# Patient Record
Sex: FEMALE | Race: WHITE
[De-identification: names, ages, dates, MRNs, and addresses within clinical notes are randomized per-mention and may not be internally consistent; named-entity substitution may affect disease eponyms.]

---
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
## 2022-07-24 ENCOUNTER — HOSPITAL ENCOUNTER (EMERGENCY)
Dept: HOSPITAL 75 - ER | Age: 22
Discharge: HOME | End: 2022-07-24
Payer: MEDICAID

## 2022-07-24 VITALS — DIASTOLIC BLOOD PRESSURE: 111 MMHG | SYSTOLIC BLOOD PRESSURE: 160 MMHG

## 2022-07-24 VITALS — WEIGHT: 139.99 LBS | HEIGHT: 68.9 IN | BODY MASS INDEX: 20.73 KG/M2

## 2022-07-24 DIAGNOSIS — S09.93XA: Primary | ICD-10-CM
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
## 2022-07-24 PROCEDURE — 99281 EMR DPT VST MAYX REQ PHY/QHP: CPT

## 2022-09-16 ENCOUNTER — HOSPITAL ENCOUNTER (EMERGENCY)
Dept: HOSPITAL 75 - ER | Age: 22
Discharge: HOME | End: 2022-09-16
Payer: MEDICAID

## 2022-09-16 VITALS — SYSTOLIC BLOOD PRESSURE: 144 MMHG | DIASTOLIC BLOOD PRESSURE: 95 MMHG

## 2022-09-16 VITALS — BODY MASS INDEX: 20.73 KG/M2 | WEIGHT: 139.99 LBS | HEIGHT: 69.02 IN

## 2022-09-16 DIAGNOSIS — F12.90: ICD-10-CM

## 2022-09-16 DIAGNOSIS — S39.012A: ICD-10-CM

## 2022-09-16 DIAGNOSIS — Z28.310: ICD-10-CM
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
## 2022-09-16 DIAGNOSIS — S22.32XA: Primary | ICD-10-CM

## 2022-09-16 DIAGNOSIS — S06.9X9A: ICD-10-CM

## 2022-09-16 DIAGNOSIS — K52.9: ICD-10-CM

## 2022-09-16 DIAGNOSIS — S00.11XA: ICD-10-CM

## 2022-09-16 DIAGNOSIS — N39.0: ICD-10-CM

## 2022-09-16 LAB
ALBUMIN SERPL-MCNC: 3.8 GM/DL (ref 3.2–4.5)
ALP SERPL-CCNC: 73 U/L (ref 40–136)
ALT SERPL-CCNC: 18 U/L (ref 0–55)
AMYLASE SERPL-CCNC: 59 U/L (ref 25–125)
APAP SERPL-MCNC: < 10 UG/ML (ref 10–30)
APTT BLD: 29 SEC (ref 24–35)
APTT PPP: YELLOW S
BACTERIA #/AREA URNS HPF: (no result) /HPF
BARBITURATES UR QL: NEGATIVE
BASOPHILS # BLD AUTO: 0 10^3/UL (ref 0–0.1)
BASOPHILS NFR BLD AUTO: 1 % (ref 0–10)
BENZODIAZ UR QL SCN: NEGATIVE
BILIRUB SERPL-MCNC: 0.6 MG/DL (ref 0.1–1)
BILIRUB UR QL STRIP: NEGATIVE
BUN/CREAT SERPL: 11
CALCIUM SERPL-MCNC: 9 MG/DL (ref 8.5–10.1)
CHLORIDE SERPL-SCNC: 99 MMOL/L (ref 98–107)
CK MB SERPL-MCNC: 2.1 NG/ML (ref ?–6.6)
CK SERPL-CCNC: 254 U/L (ref 29–168)
CO2 SERPL-SCNC: 23 MMOL/L (ref 21–32)
COCAINE UR QL: NEGATIVE
CREAT SERPL-MCNC: 0.8 MG/DL (ref 0.6–1.3)
EOSINOPHIL # BLD AUTO: 0 10^3/UL (ref 0–0.3)
EOSINOPHIL NFR BLD AUTO: 0 % (ref 0–10)
FIBRINOGEN PPP-MCNC: CLEAR MG/DL
GFR SERPLBLD BASED ON 1.73 SQ M-ARVRAT: 108 ML/MIN
GLUCOSE SERPL-MCNC: 100 MG/DL (ref 70–105)
GLUCOSE UR STRIP-MCNC: NEGATIVE MG/DL
HCT VFR BLD CALC: 39 % (ref 35–52)
HGB BLD-MCNC: 13.4 G/DL (ref 11.5–16)
INR PPP: 1 (ref 0.8–1.4)
KETONES UR QL STRIP: NEGATIVE
LEUKOCYTE ESTERASE UR QL STRIP: NEGATIVE
LIPASE SERPL-CCNC: 33 U/L (ref 8–78)
LYMPHOCYTES # BLD AUTO: 1 10^3/UL (ref 1–4)
LYMPHOCYTES NFR BLD AUTO: 11 % (ref 12–44)
MAGNESIUM SERPL-MCNC: 1.6 MG/DL (ref 1.6–2.4)
MANUAL DIFFERENTIAL PERFORMED BLD QL: NO
MCH RBC QN AUTO: 31 PG (ref 25–34)
MCHC RBC AUTO-ENTMCNC: 34 G/DL (ref 32–36)
MCV RBC AUTO: 92 FL (ref 80–99)
METHADONE UR QL SCN: NEGATIVE
MONOCYTES # BLD AUTO: 0.6 10^3/UL (ref 0–1)
MONOCYTES NFR BLD AUTO: 7 % (ref 0–12)
NEUTROPHILS # BLD AUTO: 7.2 10^3/UL (ref 1.8–7.8)
NEUTROPHILS NFR BLD AUTO: 81 % (ref 42–75)
NITRITE UR QL STRIP: NEGATIVE
OPIATES UR QL SCN: NEGATIVE
OXYCODONE UR QL: NEGATIVE
PH UR STRIP: 6 [PH] (ref 5–9)
PLATELET # BLD: 210 10^3/UL (ref 130–400)
PMV BLD AUTO: 11.4 FL (ref 9–12.2)
POTASSIUM SERPL-SCNC: 3.3 MMOL/L (ref 3.6–5)
PROPOXYPH UR QL: NEGATIVE
PROT SERPL-MCNC: 6.8 GM/DL (ref 6.4–8.2)
PROT UR QL STRIP: NEGATIVE
PROTHROMBIN TIME: 13.7 SEC (ref 12.2–14.7)
RBC #/AREA URNS HPF: (no result) /HPF
SODIUM SERPL-SCNC: 136 MMOL/L (ref 135–145)
SP GR UR STRIP: 1.01 (ref 1.02–1.02)
SQUAMOUS #/AREA URNS HPF: (no result) /HPF
TRICYCLICS UR QL SCN: NEGATIVE
WBC # BLD AUTO: 8.8 10^3/UL (ref 4.3–11)
WBC #/AREA URNS HPF: (no result) /HPF

## 2022-09-16 PROCEDURE — 72170 X-RAY EXAM OF PELVIS: CPT

## 2022-09-16 PROCEDURE — 85610 PROTHROMBIN TIME: CPT

## 2022-09-16 PROCEDURE — 80053 COMPREHEN METABOLIC PANEL: CPT

## 2022-09-16 PROCEDURE — 81000 URINALYSIS NONAUTO W/SCOPE: CPT

## 2022-09-16 PROCEDURE — 80320 DRUG SCREEN QUANTALCOHOLS: CPT

## 2022-09-16 PROCEDURE — 71045 X-RAY EXAM CHEST 1 VIEW: CPT

## 2022-09-16 PROCEDURE — 70486 CT MAXILLOFACIAL W/O DYE: CPT

## 2022-09-16 PROCEDURE — 82150 ASSAY OF AMYLASE: CPT

## 2022-09-16 PROCEDURE — 72128 CT CHEST SPINE W/O DYE: CPT

## 2022-09-16 PROCEDURE — 83874 ASSAY OF MYOGLOBIN: CPT

## 2022-09-16 PROCEDURE — 80329 ANALGESICS NON-OPIOID 1 OR 2: CPT

## 2022-09-16 PROCEDURE — 83690 ASSAY OF LIPASE: CPT

## 2022-09-16 PROCEDURE — 82553 CREATINE MB FRACTION: CPT

## 2022-09-16 PROCEDURE — 71260 CT THORAX DX C+: CPT

## 2022-09-16 PROCEDURE — 70450 CT HEAD/BRAIN W/O DYE: CPT

## 2022-09-16 PROCEDURE — 83735 ASSAY OF MAGNESIUM: CPT

## 2022-09-16 PROCEDURE — 36415 COLL VENOUS BLD VENIPUNCTURE: CPT

## 2022-09-16 PROCEDURE — 85730 THROMBOPLASTIN TIME PARTIAL: CPT

## 2022-09-16 PROCEDURE — 84703 CHORIONIC GONADOTROPIN ASSAY: CPT

## 2022-09-16 PROCEDURE — 74177 CT ABD & PELVIS W/CONTRAST: CPT

## 2022-09-16 PROCEDURE — 72125 CT NECK SPINE W/O DYE: CPT

## 2022-09-16 PROCEDURE — 82550 ASSAY OF CK (CPK): CPT

## 2022-09-16 PROCEDURE — 85025 COMPLETE CBC W/AUTO DIFF WBC: CPT

## 2022-09-16 PROCEDURE — 87088 URINE BACTERIA CULTURE: CPT

## 2022-09-16 PROCEDURE — 72131 CT LUMBAR SPINE W/O DYE: CPT

## 2022-09-16 PROCEDURE — 80306 DRUG TEST PRSMV INSTRMNT: CPT

## 2022-09-16 NOTE — DIAGNOSTIC IMAGING REPORT
PROCEDURE: CT chest, abdomen, and pelvis with contrast.



TECHNIQUE: Multiple contiguous axial images were obtained through

the chest, abdomen, and pelvis after the administration of

intravenous contrast. Auto Exposure Controls were utilized during

the CT exam to meet ALARA standards for radiation dose reduction.





INDICATION: Pain, trauma.



COMPARISON: Imaging from the same date.



FINDINGS: No significant adenopathy in the chest. No aneurysmal

dilatation of the thoracic aorta. The heart is within normal

limits in size. No pericardial effusion. No pleural effusion. The

trachea is patent. No pneumothorax. The lungs are clear. Acute to

subacute nondisplaced lateral left 9th rib fracture. No

additional acute osseous abnormality within the chest.



The liver is unremarkable. The spleen is at the upper limits of

normal in size without focal splenic mass. The adrenal glands are

unremarkable. The pancreas is unremarkable. The gallbladder is

predominantly decompressed. Contrast is identified within the

bilateral kidneys. Otherwise, the kidneys are unremarkable. No

aneurysmal dilatation of the abdominal aorta. The urinary bladder

is unremarkable. The uterus and adnexal structures are

unremarkable for age. Significant mural thickening is noted

involving the ascending colon and proximal transverse colon. This

is associated with mild adjacent inflammatory stranding.

Additionally, lymph nodes within the right lower quadrant appear

enlarged. What is felt to relate to the appendix is unremarkable.

No free air within the abdomen. No acute osseous abnormality

within the abdomen or pelvis.



IMPRESSION:

1. Findings concerning for colitis involving the ascending and

proximal transverse colon with associated reactive adenopathy.

Etiology is uncertain, though favored to relate to an infectious

or inflammatory process.

2. Acute to subacute nondisplaced lateral left 9th rib fracture.

Recommend correlation for focal pain at this location.

3. Additional findings as above.



Dictated by: 



  Dictated on workstation # [id number]

## 2022-09-16 NOTE — DIAGNOSTIC IMAGING REPORT
PROCEDURE: CT thoracic and lumbar spine without contrast.



TECHNIQUE: Multiple contiguous axial images were obtained through

the thoracic and lumbar spine without the use of intravenous

contrast. Sagittal and coronal reformations were then performed.

All CT scans use one or more of the following dose optimizing

techniques: automated exposure control, MA and/or KvP adjustment

based on a patient size and exam type, or iterative

reconstruction. 



INDICATION:  Trauma, pain



EXAMINATION: CT thoracic and lumbar spine 09/16/2022.



FINDINGS:



There is normal height and alignment of the vertebral bodies. No

compression deformities appreciated throughout the thoracic

spine. Within the lumbar spine, there is normal height and

alignment of the vertebral bodies with no fractures appreciated.

No subluxations. Likely bulging disc material noted at L4-L5

without significant central stenosis. Visualized lungs clear.

Visualized intra-abdominal structures unremarkable for acute

abnormality.



IMPRESSION:

1. Unremarkable CT thoracic and lumbar spine.



Dictated by: 



  Dictated on workstation # [id number]
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
